# Patient Record
Sex: FEMALE | Race: AMERICAN INDIAN OR ALASKA NATIVE
[De-identification: names, ages, dates, MRNs, and addresses within clinical notes are randomized per-mention and may not be internally consistent; named-entity substitution may affect disease eponyms.]

---

## 2019-08-06 ENCOUNTER — HOSPITAL ENCOUNTER (OUTPATIENT)
Dept: HOSPITAL 5 - SPVWC | Age: 49
Discharge: HOME | End: 2019-08-06
Attending: SURGERY
Payer: MEDICARE

## 2019-08-06 DIAGNOSIS — E78.5: ICD-10-CM

## 2019-08-06 DIAGNOSIS — Z85.3: ICD-10-CM

## 2019-08-06 DIAGNOSIS — R92.8: Primary | ICD-10-CM

## 2019-08-06 DIAGNOSIS — I10: ICD-10-CM

## 2019-08-06 PROCEDURE — 77066 DX MAMMO INCL CAD BI: CPT

## 2019-08-06 NOTE — MAMMOGRAPHY REPORT
DIGITAL DIAGNOSTIC MAMMOGRAM WITH CAD, 8/6/2019



INDICATION: History of left breast cancer treated with partial mastectomy and radiation in 2018.



TECHNIQUE:  Digital bilateral mammographic imaging was performed.

This examination was interpreted with the benefit of Computer-aided Detection analysis. 



COMPARISON: 7/25/2018



FINDINGS: 



Breast Density: The breasts are heterogeneously dense, which may obscure small masses.



There is no evidence of dominant mass, suspicious calcifications or architectural distortion in eithe
r breast. Previously noted abnormal area of calcifications in the inferior left breast have been kasi
guy. Postlumpectomy and radiation changes are now present in the left breast.



IMPRESSION: No evidence of malignancy. Postlumpectomy/radiation change on the left.



BI-RADS Category 2:  Benign. Recommend routine screening mammography in one year



A "normal" or negative report should not discourage follow up or biopsy of a clinically significant f
inding.



A written summary of these findings will be mailed to the patient. The patient will be entered into a
 mammography reporting system which will generate a reminder letter for the patient's next appointmen
t at the appropriate interval.



FURTHER INFORMATION:  According to the [employer], yearly mammograms are recommend
ed starting at age 40 and continuing as long as a woman is in good health.  Breast MRI is recommended
 for women with an approximately 20-25% or greater lifetime risk of breast cancer, including women wi
th a strong family history of breast or ovarian cancer and women who have been treated for Hodgkin's 
disease.



Signer Name: Hattie Santos MD 

Signed: 8/6/2019 3:03 PM

 Workstation Name: [id number]

## 2020-02-11 ENCOUNTER — HOSPITAL ENCOUNTER (OUTPATIENT)
Dept: HOSPITAL 5 - SPVWC | Age: 50
Discharge: HOME | End: 2020-02-11
Attending: SURGERY
Payer: MEDICARE

## 2020-02-11 DIAGNOSIS — Z90.12: ICD-10-CM

## 2020-02-11 DIAGNOSIS — C50.912: Primary | ICD-10-CM

## 2020-02-11 DIAGNOSIS — N64.89: ICD-10-CM

## 2020-02-11 NOTE — MAMMOGRAPHY REPORT
DIGITAL DIAGNOSTIC MAMMOGRAM WITH CAD, 2/11/2020



INDICATION: Left breast cancer status post partial mastectomy 11/12/2018 with subsequent radiation th
erapy.



TECHNIQUE:  Digital left mammographic imaging was performed.

This examination was interpreted with the benefit of Computer-aided Detection analysis. 



COMPARISON: 8/6/2019



FINDINGS: 



Breast Density: The breast is heterogeneously dense, which may obscure small masses. There is no evid
ence of dominant mass, suspicious calcifications or architectural distortion in the left breast. Cent
ral posterior postsurgical scar. Scattered benign calcifications. Moderate skin thickening of the ezequiel
ast.



IMPRESSION: No mammographic evidence of malignancy. Benign posttreatment changes.





Follow up recommendation: Routine yearly



BI-RADS Category 2:  Benign.



A "normal" or negative report should not discourage follow up or biopsy of a clinically significant f
inding.



A written summary of these findings will be mailed to the patient. The patient will be entered into a
 mammography reporting system which will generate a reminder letter for the patient's next appointmen
t at the appropriate interval.



According to the [employer], yearly mammograms are recommended starting at age 40 
and continuing as long as a woman is in good health.  Breast MRI is recommended for women with an ibrahima
roximately 20-25% or greater lifetime risk of breast cancer, including women with a strong family his
tory of breast or ovarian cancer and women who have been treated for Hodgkin's disease.



Signer Name: Rishabh Reza MD 

Signed: 2/11/2020 11:35 AM

 Workstation Name: [id number]

## 2020-08-11 ENCOUNTER — HOSPITAL ENCOUNTER (OUTPATIENT)
Dept: HOSPITAL 5 - SPVWC | Age: 50
Discharge: HOME | End: 2020-08-11
Attending: SURGERY
Payer: MEDICARE

## 2020-08-11 DIAGNOSIS — C50.512: Primary | ICD-10-CM

## 2020-08-11 DIAGNOSIS — Z85.3: ICD-10-CM

## 2020-08-11 PROCEDURE — 77066 DX MAMMO INCL CAD BI: CPT

## 2020-08-11 NOTE — MAMMOGRAPHY REPORT
DIGITAL DIAGNOSTIC MAMMOGRAM WITH CAD, 8/11/2020



INDICATION: History of left breast carcinoma



TECHNIQUE:  Digital bilateral mammographic imaging was performed.

This examination was interpreted with the benefit of Computer-aided Detection analysis. 



COMPARISON: 7/25/2018, 9/10/2018, 2/11/2020



FINDINGS: 



Breast Density: The breasts are heterogeneously dense, which may obscure small masses.



There is no evidence of dominant mass, suspicious calcifications or architectural distortion in eithe
r breast. Postlumpectomy and radiation changes are noted in the inferior left breast not appreciably 
changed from the most recent mammogram of 2/11/2020. Overall, the mammogram is unchanged.



IMPRESSION: No evidence of malignancy.



Follow up recommendation: Routine yearly



BI-RADS Category 2:  Benign.



A "normal" or negative report should not discourage follow up or biopsy of a clinically significant f
inding.



A written summary of these findings will be mailed to the patient. The patient will be entered into a
 mammography reporting system which will generate a reminder letter for the patient's next appointmen
t at the appropriate interval.



According to the [employer], yearly mammograms are recommended starting at age 40 
and continuing as long as a woman is in good health.  Breast MRI is recommended for women with an ibrahima
roximately 20-25% or greater lifetime risk of breast cancer, including women with a strong family his
tory of breast or ovarian cancer and women who have been treated for Hodgkin's disease.



Signer Name: Hattie Santos MD 

Signed: 8/11/2020 12:23 PM

Workstation Name: [id number]

## 2021-08-27 ENCOUNTER — HOSPITAL ENCOUNTER (OUTPATIENT)
Dept: HOSPITAL 5 - SPVWC | Age: 51
Discharge: HOME | End: 2021-08-27
Attending: SURGERY
Payer: MEDICARE

## 2021-08-27 DIAGNOSIS — Z12.31: Primary | ICD-10-CM

## 2021-08-27 PROCEDURE — 77067 SCR MAMMO BI INCL CAD: CPT

## 2021-08-27 PROCEDURE — 77063 BREAST TOMOSYNTHESIS BI: CPT
